# Patient Record
Sex: FEMALE | Race: WHITE | URBAN - METROPOLITAN AREA
[De-identification: names, ages, dates, MRNs, and addresses within clinical notes are randomized per-mention and may not be internally consistent; named-entity substitution may affect disease eponyms.]

---

## 2023-12-07 ENCOUNTER — APPOINTMENT (RX ONLY)
Dept: URBAN - METROPOLITAN AREA CLINIC 356 | Facility: CLINIC | Age: 63
Setting detail: DERMATOLOGY
End: 2023-12-07

## 2023-12-07 DIAGNOSIS — Z41.9 ENCOUNTER FOR PROCEDURE FOR PURPOSES OTHER THAN REMEDYING HEALTH STATE, UNSPECIFIED: ICD-10-CM

## 2023-12-07 PROCEDURE — ? BOTOX

## 2023-12-07 PROCEDURE — ? ADDITIONAL NOTES

## 2023-12-07 NOTE — PROCEDURE: BOTOX
[facility] Units: 0
Show Levator Superior Units: Yes
Dilution (U/0.1 Cc): 4
Incrementing Botox Units: By 0.5 Units
Show Right And Left Brow Units: No
Detail Level: Detailed
Expiration Date (Month Year): 11/2025
Price (Use Numbers Only, No Special Characters Or $): 112
Consent: Written consent obtained. Risks include but not limited to lid/brow ptosis, bruising, swelling, diplopia, temporary effect, incomplete chemical denervation.
Additional Area 1 Location: lip
Additional Area 1 Units: 8
Post-Care Instructions: Patient instructed to not lie down for 4 hours and limit physical activity for 24 hours.
Lot #: [id number]

## 2023-12-07 NOTE — PROCEDURE: ADDITIONAL NOTES
Additional Notes: Recommended RHA redensity for perioral lip lines and either Vollure or Defyne for NLF.  Patient quoted $1400.
Render Risk Assessment In Note?: no
Detail Level: Detailed